# Patient Record
Sex: MALE | Race: WHITE | NOT HISPANIC OR LATINO | URBAN - METROPOLITAN AREA
[De-identification: names, ages, dates, MRNs, and addresses within clinical notes are randomized per-mention and may not be internally consistent; named-entity substitution may affect disease eponyms.]

---
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
## 2025-07-15 ENCOUNTER — APPOINTMENT (OUTPATIENT)
Dept: GENERAL RADIOLOGY | Facility: HOSPITAL | Age: 20
End: 2025-07-15
Payer: OTHER GOVERNMENT

## 2025-07-15 ENCOUNTER — HOSPITAL ENCOUNTER (EMERGENCY)
Facility: HOSPITAL | Age: 20
Discharge: HOME | End: 2025-07-15
Attending: EMERGENCY MEDICINE | Admitting: EMERGENCY MEDICINE
Payer: OTHER GOVERNMENT

## 2025-07-15 VITALS
HEIGHT: 69 IN | HEART RATE: 64 BPM | OXYGEN SATURATION: 99 % | WEIGHT: 156.75 LBS | RESPIRATION RATE: 18 BRPM | DIASTOLIC BLOOD PRESSURE: 89 MMHG | TEMPERATURE: 97.9 F | SYSTOLIC BLOOD PRESSURE: 155 MMHG | BODY MASS INDEX: 23.22 KG/M2

## 2025-07-15 DIAGNOSIS — S63.266A CLOSED DISLOCATION OF FIFTH METACARPAL BONE OF RIGHT HAND: ICD-10-CM

## 2025-07-15 DIAGNOSIS — S62.316A CLOSED DISPLACED FRACTURE OF BASE OF FIFTH METACARPAL BONE OF RIGHT HAND, INITIAL ENCOUNTER: Primary | ICD-10-CM

## 2025-07-15 PROCEDURE — 73130 X-RAY EXAM OF HAND: CPT

## 2025-07-15 PROCEDURE — 99283 EMERGENCY DEPT VISIT LOW MDM: CPT

## 2025-07-15 RX ORDER — TRAMADOL HYDROCHLORIDE 50 MG/1
50 TABLET ORAL ONCE
Status: COMPLETED | OUTPATIENT
Start: 2025-07-15 | End: 2025-07-15

## 2025-07-15 RX ADMIN — TRAMADOL HYDROCHLORIDE 50 MG: 50 TABLET, COATED ORAL at 05:45

## 2025-07-15 NOTE — ED PROVIDER NOTES
Time: 5:16 AM EDT  Date of encounter:  7/15/2025  Independent Historian/Clinical History and Information was obtained by:   Patient    History is limited by: N/A    Chief Complaint: Hand injury      History of Present Illness:  Patient is a 20 y.o. year old male who presents to the emergency department for evaluation of right hand injury.  Patient got angry and ended up punching a wall about 1:30 AM and is having pain and swelling since then.  Patient is right-handed.  He rates his pain about a 6 out of 10.  No previous injury before.  No numbness tingling or weakness.  Did not take anything prior to arrival for pain.  Last time he ate or drink was 10 PM      Patient Care Team  Primary Care Provider: Provider, No Known    Past Medical History:     No Known Allergies  History reviewed. No pertinent past medical history.  Past Surgical History:   Procedure Laterality Date    APPENDECTOMY       History reviewed. No pertinent family history.    Home Medications:  Prior to Admission medications    Medication Sig Start Date End Date Taking? Authorizing Provider   ketorolac (TORADOL) 10 MG tablet Take 1 tablet by mouth Every 6 (Six) Hours As Needed for Moderate Pain. 9/24/23   Alexi Carmona MD   Lidocaine Viscous HCl (XYLOCAINE) 2 % solution 5 ml swish and spit q ac and hs prn 9/24/23   Alexi Carmona MD        Social History:   Social History     Tobacco Use    Smoking status: Never    Smokeless tobacco: Never   Vaping Use    Vaping status: Never Used   Substance Use Topics    Alcohol use: Never         Review of Systems:  Review of Systems   Musculoskeletal:  Positive for arthralgias and joint swelling.   Skin:  Negative for color change.   Neurological:  Negative for weakness and numbness.   Psychiatric/Behavioral: Negative.     All other systems reviewed and are negative.       Physical Exam:  /89 (BP Location: Left arm, Patient Position: Sitting)   Pulse 64   Temp 97.9 °F (36.6 °C) (Oral)    "Resp 18   Ht 175.3 cm (69\")   Wt 71.1 kg (156 lb 12 oz)   SpO2 99%   BMI 23.15 kg/m²     Physical Exam  Vitals and nursing note reviewed.   Constitutional:       Appearance: Normal appearance.   HENT:      Head: Atraumatic.   Cardiovascular:      Pulses: Normal pulses.   Pulmonary:      Effort: Pulmonary effort is normal.   Musculoskeletal:         General: Swelling, tenderness (Right hand and) and deformity (Right hand ulnar aspect) present.      Cervical back: Normal range of motion.      Comments: Patient does have range of motion of the right 4th and 5th fingers distal to the injury and deformity   Skin:     General: Skin is warm and dry.      Capillary Refill: Capillary refill takes less than 2 seconds.   Neurological:      General: No focal deficit present.      Mental Status: He is alert.      Sensory: No sensory deficit.      Motor: No weakness.   Psychiatric:         Mood and Affect: Mood normal.         Behavior: Behavior normal.                    Medical Decision Making:      Comorbidities that affect care:    None    External Notes reviewed:    Previous Clinic Note: Patient last visit was to urgent care back in September 2023 to dentalgia      The following orders were placed and all results were independently analyzed by me:  Orders Placed This Encounter   Procedures    XR Hand 3+ View Right    Obtain & Apply The Following- Upper extremity; (right ulnar gutter rthoglass)    IP General Consult (Use specialty-specific consult if known)       Medications Given in the Emergency Department:  Medications   traMADol (ULTRAM) tablet 50 mg (50 mg Oral Given 7/15/25 0545)        ED Course:         Labs:    Lab Results (last 24 hours)       ** No results found for the last 24 hours. **             Imaging:    XR Hand 3+ View Right  Result Date: 7/15/2025  XR HAND 3+ VW RIGHT-  Date of exam: 7/15/2025 2:39 AM.  Comparison: None available.  INDICATIONS: 20-year-old male who punched a wall (earlier this evening) " with his right hand & is now c/o right hand pain (especially medially).  FINDINGS: Three (3) views right hand were obtained. The study is ABNORMAL. There is no acute fracture-dislocation involving the right fifth (5th) carpometacarpal (CMC) joint. That is, the base of the right fifth (5th) metacarpal is dislocated in an ulnar and dorsal manner relative to the articular surface of the right hamate carpal bone. Acute displaced fracture fragments are seen, which are thought to be related to the lateral (radial) base of the right fifth (5th) metacarpal bone. These fractures are intra-articular and closed. They are displaced about 2 mm or slightly less. There is also some degree of radial and volar (palmar) angulation of the distal right fifth (5th) metacarpal bone. No other acute fractures or dislocations are identified. For instance, no definite acute hamate fracture is seen. No subcutaneous emphysema. No retained radiopaque foreign body. If symptoms or clinical concerns persist, consider imaging follow-up.       The study is ABNORMAL. There is an acute fracture-dislocation of the right fifth (5th) carpometacarpal (CMC) joint, as discussed. Please see above comments for further detail.   Portions of this note were completed with a voice recognition program.  7/15/2025 2:50 AM by Alexi Springer MD on Workstation: [employer]          Differential Diagnosis and Discussion:    Extremity Pain: Differential diagnosis includes but is not limited to soft tissue sprain, tendonitis, tendon injury, dislocation, fracture, deep vein thrombosis, arterial insufficiency, osteoarthritis, bursitis, and ligamentous damage.    PROCEDURES:    X-ray were performed in the emergency department and all X-ray impressions were independently interpreted by me.    No orders to display       Procedures    MDM  Number of Diagnoses or Management Options  Diagnosis management comments: The patient was placed in a Ortho-Glass ulnar gutter splint in the  emergency department. The patient was reassessed status post splinting. The patient in neurovascular intact with no numbness, tingling, or signs of compartment syndrome. The patient was counseled to follow up with the [facility] as detailed in the discharge instructions. The patient was counseled on the signs and symptoms of compartment syndrome including worsening pain, swelling, sensory abnormalities, and color change. The patient was instructed to return to the ED sooner for re-evaluation of any of these symptoms.       Amount and/or Complexity of Data Reviewed  Tests in the radiology section of CPT®: ordered and reviewed  Tests in the medicine section of CPT®: ordered and reviewed  Discuss the patient with other providers: yes ([facility])    Risk of Complications, Morbidity, and/or Mortality  Presenting problems: low  Diagnostic procedures: low  Management options: low    Patient Progress  Patient progress: stable               Patient Care Considerations:    NARCOTICS: I considered prescribing opiate pain medication as an outpatient, however patient did not follow-up with [facility] today and they can provide narcotics after they decide on his plan of care      Consultants/Shared Management Plan:    Consultant: I have discussed the case with Luana lawson [facility] who states placed the patient in a ulnar gutter Ortho-Glass splint and the patient can be discharged home they will call him this morning with a same-day appointment to be evaluated today in the office    Social Determinants of Health:    Patient is independent, reliable, and has access to care.       Disposition and Care Coordination:    Discharged: I considered escalation of care by admitting this patient to the hospital, however after consulting the hand surgeon they can see him today in the office so he does not need to be transferred or currently admitted    I have explained the patient´s condition, diagnoses and treatment plan based on the  information available to me at this time. I have answered questions and addressed any concerns. The patient has a good  understanding of the patient´s diagnosis, condition, and treatment plan as can be expected at this point. The vital signs have been stable. The patient´s condition is stable and appropriate for discharge from the emergency department.      The patient will pursue further outpatient evaluation with the primary care physician or other designated or consulting physician as outlined in the discharge instructions. They are agreeable to this plan of care and follow-up instructions have been explained in detail. The patient has received these instructions in written format and has expressed an understanding of the discharge instructions. The patient is aware that any significant change in condition or worsening of symptoms should prompt an immediate return to this or the closest emergency department or call to 911.  I have explained discharge medications and the need for follow up with the patient/caretakers. This was also printed in the discharge instructions. Patient was discharged with the following medications and follow up:      Medication List        New Prescriptions      diclofenac 50 MG EC tablet  Commonly known as: VOLTAREN  Take 1 tablet by mouth 3 (Three) Times a Day As Needed (pain).            Stop      ketorolac 10 MG tablet  Commonly known as: TORADOL     Lidocaine Viscous HCl 2 % solution  Commonly known as: XYLOCAINE               Where to Get Your Medications        These medications were sent to [facility]/pharmacy #[id number] - [place], [place] - [address] - [phone] [facility] [phone] FX  [address] [place] [address]      Hours: 24-hours Phone: [phone]   diclofenac 50 MG EC tablet      No follow-up provider specified.     Final diagnoses:   Closed displaced fracture of base of fifth metacarpal bone of right hand, initial encounter   Closed dislocation of fifth  metacarpal bone of right hand        ED Disposition       ED Disposition   Discharge    Condition   Stable    Comment   --               This medical record created using voice recognition software.             Niurka Leslie, APRN  07/15/25 7713

## 2025-07-15 NOTE — ED NOTES
Staff gave patient an ice pack to place on his injury in the lobby while waiting for room-placement.

## 2025-07-15 NOTE — ED PROVIDER NOTES
"SHARED VISIT ATTESTATION:    This visit was performed by myself and an APC.  I personally approved the management plan/medical decision making and take responsibility for the patient management.      SHARED VISIT NOTE:    Patient is 20 y.o. year old male that presents to the ED for evaluation of hand injury.     Physical Exam    ED Course:    /89 (BP Location: Left arm, Patient Position: Sitting)   Pulse 64   Temp 97.9 °F (36.6 °C) (Oral)   Resp 18   Ht 175.3 cm (69\")   Wt 71.1 kg (156 lb 12 oz)   SpO2 99%   BMI 23.15 kg/m²       The following orders were placed and all results were independently analyzed by me:  Orders Placed This Encounter   Procedures    XR Hand 3+ View Right    Obtain & Apply The Following- Upper extremity; (right ulnar gutter rthoglass)    IP General Consult (Use specialty-specific consult if known)       Medications Given in the Emergency Department:  Medications   traMADol (ULTRAM) tablet 50 mg (50 mg Oral Given 7/15/25 0545)        ED Course:         Labs:    Lab Results (last 24 hours)       ** No results found for the last 24 hours. **             Imaging:    XR Hand 3+ View Right  Result Date: 7/15/2025  XR HAND 3+ VW RIGHT-  Date of exam: 7/15/2025 2:39 AM.  Comparison: None available.  INDICATIONS: 20-year-old male who punched a wall (earlier this evening) with his right hand & is now c/o right hand pain (especially medially).  FINDINGS: Three (3) views right hand were obtained. The study is ABNORMAL. There is no acute fracture-dislocation involving the right fifth (5th) carpometacarpal (CMC) joint. That is, the base of the right fifth (5th) metacarpal is dislocated in an ulnar and dorsal manner relative to the articular surface of the right hamate carpal bone. Acute displaced fracture fragments are seen, which are thought to be related to the lateral (radial) base of the right fifth (5th) metacarpal bone. These fractures are intra-articular and closed. They are displaced " about 2 mm or slightly less. There is also some degree of radial and volar (palmar) angulation of the distal right fifth (5th) metacarpal bone. No other acute fractures or dislocations are identified. For instance, no definite acute hamate fracture is seen. No subcutaneous emphysema. No retained radiopaque foreign body. If symptoms or clinical concerns persist, consider imaging follow-up.       The study is ABNORMAL. There is an acute fracture-dislocation of the right fifth (5th) carpometacarpal (CMC) joint, as discussed. Please see above comments for further detail.   Portions of this note were completed with a voice recognition program.  7/15/2025 2:50 AM by Alexi Springer MD on Workstation: [id number]        MDM:    Procedures    X-ray were performed in the emergency department and all X-ray impressions were independently interpreted by me.                     Mercy Villegas MD  05:50 EDT  07/15/25         Mercy Villegas MD  07/15/25 0618

## 2025-07-15 NOTE — DISCHARGE INSTRUCTIONS
As we discussed [facility] will see today they will call you to make an appointment for the office.    Leave your splint in place until you are seen by them.    Elevate.    Medication as prescribed for symptomatic pain relief